# Patient Record
Sex: FEMALE | Race: BLACK OR AFRICAN AMERICAN
[De-identification: names, ages, dates, MRNs, and addresses within clinical notes are randomized per-mention and may not be internally consistent; named-entity substitution may affect disease eponyms.]

---

## 2020-05-29 ENCOUNTER — HOSPITAL ENCOUNTER (EMERGENCY)
Dept: HOSPITAL 72 - EMR | Age: 40
Discharge: HOME | End: 2020-05-29
Payer: COMMERCIAL

## 2020-05-29 VITALS — SYSTOLIC BLOOD PRESSURE: 117 MMHG | DIASTOLIC BLOOD PRESSURE: 78 MMHG

## 2020-05-29 VITALS — BODY MASS INDEX: 27.31 KG/M2 | WEIGHT: 160 LBS | HEIGHT: 64 IN

## 2020-05-29 DIAGNOSIS — M79.604: ICD-10-CM

## 2020-05-29 DIAGNOSIS — Z88.5: ICD-10-CM

## 2020-05-29 DIAGNOSIS — R79.1: ICD-10-CM

## 2020-05-29 DIAGNOSIS — I82.591: Primary | ICD-10-CM

## 2020-05-29 DIAGNOSIS — Z87.891: ICD-10-CM

## 2020-05-29 DIAGNOSIS — Z79.01: ICD-10-CM

## 2020-05-29 LAB
ADD MANUAL DIFF: NO
ANION GAP SERPL CALC-SCNC: 13 MMOL/L (ref 5–15)
APTT BLD: 24 SEC (ref 23–33)
BASOPHILS NFR BLD AUTO: 1.1 % (ref 0–2)
BUN SERPL-MCNC: 6 MG/DL (ref 7–18)
CALCIUM SERPL-MCNC: 8.4 MG/DL (ref 8.5–10.1)
CHLORIDE SERPL-SCNC: 104 MMOL/L (ref 98–107)
CO2 SERPL-SCNC: 24 MMOL/L (ref 21–32)
CREAT SERPL-MCNC: 0.8 MG/DL (ref 0.55–1.3)
EOSINOPHIL NFR BLD AUTO: 1.6 % (ref 0–3)
ERYTHROCYTE [DISTWIDTH] IN BLOOD BY AUTOMATED COUNT: 14.6 % (ref 11.6–14.8)
HCT VFR BLD CALC: 33.4 % (ref 37–47)
HGB BLD-MCNC: 10.8 G/DL (ref 12–16)
INR PPP: 1 (ref 0.9–1.1)
LYMPHOCYTES NFR BLD AUTO: 30.2 % (ref 20–45)
MCV RBC AUTO: 76 FL (ref 80–99)
MONOCYTES NFR BLD AUTO: 8.6 % (ref 1–10)
NEUTROPHILS NFR BLD AUTO: 58.6 % (ref 45–75)
PLATELET # BLD: 264 K/UL (ref 150–450)
POTASSIUM SERPL-SCNC: 3.5 MMOL/L (ref 3.5–5.1)
RBC # BLD AUTO: 4.4 M/UL (ref 4.2–5.4)
SODIUM SERPL-SCNC: 141 MMOL/L (ref 136–145)
WBC # BLD AUTO: 3.5 K/UL (ref 4.8–10.8)

## 2020-05-29 PROCEDURE — 85610 PROTHROMBIN TIME: CPT

## 2020-05-29 PROCEDURE — 93971 EXTREMITY STUDY: CPT

## 2020-05-29 PROCEDURE — 36415 COLL VENOUS BLD VENIPUNCTURE: CPT

## 2020-05-29 PROCEDURE — 99284 EMERGENCY DEPT VISIT MOD MDM: CPT

## 2020-05-29 PROCEDURE — 85025 COMPLETE CBC W/AUTO DIFF WBC: CPT

## 2020-05-29 PROCEDURE — 85730 THROMBOPLASTIN TIME PARTIAL: CPT

## 2020-05-29 PROCEDURE — 80048 BASIC METABOLIC PNL TOTAL CA: CPT

## 2020-05-29 NOTE — EMERGENCY ROOM REPORT
History of Present Illness


General


Chief Complaint:  Pain


Source:  Patient





Present Illness


HPI


40-year-old female presents to the emergency department complaining of 10 out 

of 10 severity pain, swelling and tenderness in the right leg progressive x1 

month.  Patient reports she first began feeling symptoms at the end of April.  

She reports she has a history of blood clot earlier this year and states 

February she began treatment on Eliquis 5 mg.  Patient states she is currently 

taking medication.  Patient reports that she quit smoking.  She denies 

pregnancy or suspicion of pregnancy.  She denies taking estrogen replacement 

therapy.  She denies recent travel or immobilization.  She denies fevers or 

chills, trauma or fall.  Patient reports pain is exacerbated upon walking, 

standing for long periods of time and palpation.  No other aggravating or 

relieving factors at this time.


Allergies:  


Coded Allergies:  


     CODEINE (Verified  Allergy, Unknown, 5/29/20)





COVID-19 Screening


Contact w/high risk pt:  No


Recent Travel to affected area:  No


Experienced COVID-19 symptoms?:  No


COVID-19 Testing performed PTA:  No





Patient History


Past Medical History:  see triage record, other - Hx of RLE DVT feb 2020


Past Surgical History:  none


Pertinent Family History:  none


Social History:  Reports: smoking - quit


Last Menstrual Period:  05/02/20


Pregnant Now:  No


Reviewed Nursing Documentation:  PMH: Agreed; PSxH: Agreed





Nursing Documentation-PMH


Past Medical History:  No History, Except For





Review of Systems


All Other Systems:  negative except mentioned in HPI





Physical Exam





Vital Signs








  Date Time  Temp Pulse Resp B/P (MAP) Pulse Ox O2 Delivery O2 Flow Rate FiO2


 


5/29/20 14:37 98.4 94 18 117/78 (91) 99 Room Air  








Sp02 EP Interpretation:  reviewed, normal


General Appearance:  no apparent distress, alert, GCS 15, non-toxic


Head:  normocephalic, atraumatic


Eyes:  bilateral eye normal inspection, bilateral eye PERRL


ENT:  hearing grossly normal, normal voice


Neck:  full range of motion


Respiratory:  lungs clear, normal breath sounds, no wheezing, speaking full 

sentences


Cardiovascular #1:  regular rate, rhythm, no edema, normal capillary refill


Cardiovascular #2:  2+ dorsalis pedis (R), 2+ dorsalis pedis (L)


Musculoskeletal:  normal range of motion, gait/station normal, tender - Right 

lower extremity, calf and thigh. No erythema, mild swelling noted.


Neurologic:  alert, motor strength/tone normal, oriented x3, sensory intact, 

responsive, speech normal


Psychiatric:  judgement/insight normal


Skin:  no rash, normal color





Medical Decision Making


PA Attestation


Dr. Tan Is my supervising Physician whom patient management has been 

discussed with.


Diagnostic Impression:  


 Primary Impression:  


 Right leg pain


 Additional Impressions:  


 Subtherapeutic international normalized ratio (INR)


 Chronic deep vein thrombosis (DVT)


 Qualified Codes:  I82.591 - Chronic embolism and thrombosis of other specified 

deep vein of right lower extremity


ER Course


40-year-old female presents to the emergency department complaining of 10 out 

of 10 severity pain, swelling and tenderness in the right leg progressive x1 

month.  Patient reports she first began feeling symptoms at the end of April.  

She reports she has a history of blood clot earlier this year and states 

February she began treatment on Eliquis 5 mg.  Patient states she is currently 

taking medication.  Patient reports that she quit smoking.  She denies 

pregnancy or suspicion of pregnancy.  She denies taking estrogen replacement 

therapy.  She denies recent travel or immobilization.  She denies fevers or 

chills, trauma or fall.  Patient reports pain is exacerbated upon walking, 

standing for long periods of time and palpation.  No other aggravating or 

relieving factors at this time. 





Ddx considered but are not limited to Cellulitis, DVT, varicose vein, PAD,

Venous insufficiency





Vital signs: are WNL, pt. is afebrile





H&PE are most consistent with need to R/o acute RLE DVT. no evidence of 

cellulitis. 





ORDERS: 





-CMP, CBC with Diff: WNL


- PT/PTT-- INR- 1 ! not therapeutic for DVT treatment. 


LE duplex U/s to R/O -- No acute DVT, evidence of old DVT -recannulated. 





ED INTERVENTIONS: 


-Tylenol #3


-5mg Eliquis PO





--I discussed with this patient that according to her lab values her INR is not 

at a therapeutic level and discussed with her that we will double her dose here 

in the ER today however she needs to contact her primary care provider or 

provider who is managing her Eliquis medication and explained to them her lab 

values as this medication may need to be increased as an outpatient.





-I do not identify an emergent condition at this time. With current presentation

,  pt. is stable for close outpatient follow up and conservative treatment.  D/

w pt. to return promptly to ED with worsening or new symptoms.- Pt. verbalizes' 

understanding and agreement with proposed treatment plan.proposed treatment 

plan.





DISCHARGE: At this time pt. is stable for d/c to home. Will provide printed 

patient care instructions, and any necessary prescriptions. Care plan and 

follow up instructions have been discussed with the patient prior to discharge.





Labs








Test


  5/29/20


14:30


 


White Blood Count


  3.5 K/UL


(4.8-10.8)


 


Red Blood Count


  4.40 M/UL


(4.20-5.40)


 


Hemoglobin


  10.8 G/DL


(12.0-16.0)


 


Hematocrit


  33.4 %


(37.0-47.0)


 


Mean Corpuscular Volume 76 FL (80-99) 


 


Mean Corpuscular Hemoglobin


  24.6 PG


(27.0-31.0)


 


Mean Corpuscular Hemoglobin


Concent 32.4 G/DL


(32.0-36.0)


 


Red Cell Distribution Width


  14.6 %


(11.6-14.8)


 


Platelet Count


  264 K/UL


(150-450)


 


Mean Platelet Volume


  5.6 FL


(6.5-10.1)


 


Neutrophils (%) (Auto)


  58.6 %


(45.0-75.0)


 


Lymphocytes (%) (Auto)


  30.2 %


(20.0-45.0)


 


Monocytes (%) (Auto)


  8.6 %


(1.0-10.0)


 


Eosinophils (%) (Auto)


  1.6 %


(0.0-3.0)


 


Basophils (%) (Auto)


  1.1 %


(0.0-2.0)


 


Prothrombin Time


  11.3 SEC


(9.30-11.50)


 


Prothromb Time International


Ratio 1.0 (0.9-1.1) 


 


 


Activated Partial


Thromboplast Time 24 SEC (23-33) 


 


 


Sodium Level


  141 MMOL/L


(136-145)


 


Potassium Level


  3.5 MMOL/L


(3.5-5.1)


 


Chloride Level


  104 MMOL/L


()


 


Carbon Dioxide Level


  24 MMOL/L


(21-32)


 


Anion Gap


  13 mmol/L


(5-15)


 


Blood Urea Nitrogen 6 mg/dL (7-18) 


 


Creatinine


  0.8 MG/DL


(0.55-1.30)


 


Estimat Glomerular Filtration


Rate > 60 mL/min


(>60)


 


Glucose Level


  129 MG/DL


()


 


Calcium Level


  8.4 MG/DL


(8.5-10.1)








EKG Diagnostic Results


EP Interpretation:  Dr. Tan


Rate:  normal - 97 Bpm


Rhythm:  NSR


ST Segments:  no acute changes


ASA given to the pt in ED:  No


PA Scribe Text


This Interpretation was scribed by MEE Wynn.





CT/MRI/US Diagnostic Results


CT/MRI/US Diagnostic Results :  


   Imaging Test Ordered:  Right Lower Extremity Venous Duplex US


   Impression


" Negative for acute DVT.  Previous/old recannulated DVT is noted."  Per 

official radiology report- Please see report for specific details.





Last Vital Signs








  Date Time  Temp Pulse Resp B/P (MAP) Pulse Ox O2 Delivery O2 Flow Rate FiO2


 


5/29/20 14:37 98.4 94 18 117/78 (91) 99 Room Air  








Disposition:  HOME, SELF-CARE


Condition:  Stable


Scripts


Hydrocodone Bit/Acetaminophen 5-325* (NORCO 5-325 TABLET*) 1 Each Tablet


1 TAB ORAL Q6H PRN for FOR PAIN, #12 TAB 0 Refills


   Prov: Cathie Wynn         5/29/20


Referrals:  


[facility]ERR[facility] (PCP)











[facility]





[facility] + [facility]


Patient Instructions:  Deep Vein Thrombosis





Additional Instructions:  


Take medications as directed. Do not drink alcohol, drive, or operate heavy 

machinery while taking Norco as this may cause drowsiness. 











 ** Follow up with a Primary Care Provider in 3-5 days, even if your symptoms 

have resolved. **    INR is 1,    for Treatment of DVT, INR needs to be between 

2-3


--Please review list of primary care clinics, if you do not already have a 

primary care provider





Return sooner to ED if new symptoms occur, or current symptoms become worse. 








- Please note that this Emergency Department Report was dictated using [employer] technology software, occasionally this can lead to 

erroneous entry secondary to interpretation by the dictation equipment.











Cathie Wynn May 29, 2020 15:02

## 2020-05-29 NOTE — DIAGNOSTIC IMAGING REPORT
Indication: Right leg pain and edema, history of DVT

 

Technique: Grayscale and duplex images of the right lower extremity veins

 

Comparison: none

 

Findings: Grayscale and duplex images demonstrate nonocclusive somewhat echogenic

thrombus in the right popliteal vein resulting in incomplete compressibility. In the

remaining venous segments, no findings to suggest intraluminal thrombus. Normal

phasic Doppler waveforms, demonstrating normal augmentation response. No evidence of

valvular insufficiency. Normal compressibility

 

Impression: Evidence of focal chronic recanalized deep venous thrombosis in the right

popliteal vein. No definite evidence of acute right lower extremity deep venous

thrombosis

## 2020-05-29 NOTE — NUR
ED Nurse Note:



pt ambulated to ED d/t RT lower leg pain. pt is AOx4, calm and cooperative. 
denies any trauma/injury. VSS, on RA, afebrile on triage. Per pt, she has been 
prev. diagnosed with DVT. Placed on bed, will continue to monitor.

## 2020-05-29 NOTE — NUR
ER DISCHARGE NOTE:



Pt is cleared to be discharged per ERMD, pt is aox4, on room air, with stable 
vital signs. pt was given dc and prescription instructions, pt was able to 
verbalize understanding, pt id band and iv site removed without complications. 
pt is able to ambulate with steady gait. pt took all belongings.